# Patient Record
Sex: FEMALE | Race: OTHER | Employment: UNEMPLOYED | URBAN - METROPOLITAN AREA
[De-identification: names, ages, dates, MRNs, and addresses within clinical notes are randomized per-mention and may not be internally consistent; named-entity substitution may affect disease eponyms.]

---

## 2017-06-19 ENCOUNTER — HOSPITAL ENCOUNTER (EMERGENCY)
Age: 22
Discharge: HOME | End: 2017-06-19
Attending: STUDENT IN AN ORGANIZED HEALTH CARE EDUCATION/TRAINING PROGRAM
Payer: SELF-PAY

## 2017-06-19 ENCOUNTER — APPOINTMENT (OUTPATIENT)
Dept: ULTRASOUND IMAGING | Age: 22
End: 2017-06-19
Attending: STUDENT IN AN ORGANIZED HEALTH CARE EDUCATION/TRAINING PROGRAM
Payer: SELF-PAY

## 2017-06-19 VITALS
TEMPERATURE: 98.1 F | HEART RATE: 70 BPM | OXYGEN SATURATION: 100 % | RESPIRATION RATE: 18 BRPM | DIASTOLIC BLOOD PRESSURE: 66 MMHG | BODY MASS INDEX: 22.27 KG/M2 | WEIGHT: 110.45 LBS | SYSTOLIC BLOOD PRESSURE: 113 MMHG | HEIGHT: 59 IN

## 2017-06-19 DIAGNOSIS — O03.9 SPONTANEOUS ABORTION: Primary | ICD-10-CM

## 2017-06-19 LAB
ABO + RH BLD: NORMAL
ALBUMIN SERPL BCP-MCNC: 2.9 G/DL (ref 3.5–5)
ALBUMIN/GLOB SERPL: 0.8 {RATIO} (ref 1.1–2.2)
ALP SERPL-CCNC: 109 U/L (ref 45–117)
ALT SERPL-CCNC: 21 U/L (ref 12–78)
ANION GAP BLD CALC-SCNC: 8 MMOL/L (ref 5–15)
APPEARANCE UR: ABNORMAL
AST SERPL W P-5'-P-CCNC: 16 U/L (ref 15–37)
BACTERIA URNS QL MICRO: NEGATIVE /HPF
BASOPHILS # BLD AUTO: 0 K/UL (ref 0–0.1)
BASOPHILS # BLD: 0 % (ref 0–1)
BILIRUB SERPL-MCNC: 0.2 MG/DL (ref 0.2–1)
BILIRUB UR QL: NEGATIVE
BLOOD GROUP ANTIBODIES SERPL: NORMAL
BUN SERPL-MCNC: 10 MG/DL (ref 6–20)
BUN/CREAT SERPL: 22 (ref 12–20)
CALCIUM SERPL-MCNC: 8.2 MG/DL (ref 8.5–10.1)
CHLORIDE SERPL-SCNC: 110 MMOL/L (ref 97–108)
CO2 SERPL-SCNC: 21 MMOL/L (ref 21–32)
COLOR UR: ABNORMAL
CREAT SERPL-MCNC: 0.46 MG/DL (ref 0.55–1.02)
EOSINOPHIL # BLD: 0.2 K/UL (ref 0–0.4)
EOSINOPHIL NFR BLD: 2 % (ref 0–7)
EPITH CASTS URNS QL MICRO: ABNORMAL /LPF
ERYTHROCYTE [DISTWIDTH] IN BLOOD BY AUTOMATED COUNT: 13 % (ref 11.5–14.5)
GLOBULIN SER CALC-MCNC: 3.5 G/DL (ref 2–4)
GLUCOSE SERPL-MCNC: 94 MG/DL (ref 65–100)
GLUCOSE UR STRIP.AUTO-MCNC: NEGATIVE MG/DL
HCG SERPL-ACNC: 352 MIU/ML (ref 0–6)
HCG UR QL: POSITIVE
HCT VFR BLD AUTO: 36 % (ref 35–47)
HGB BLD-MCNC: 12.2 G/DL (ref 11.5–16)
HGB UR QL STRIP: ABNORMAL
KETONES UR QL STRIP.AUTO: NEGATIVE MG/DL
LEUKOCYTE ESTERASE UR QL STRIP.AUTO: ABNORMAL
LIPASE SERPL-CCNC: 81 U/L (ref 73–393)
LYMPHOCYTES # BLD AUTO: 18 % (ref 12–49)
LYMPHOCYTES # BLD: 2.1 K/UL (ref 0.8–3.5)
MCH RBC QN AUTO: 30.4 PG (ref 26–34)
MCHC RBC AUTO-ENTMCNC: 33.9 G/DL (ref 30–36.5)
MCV RBC AUTO: 89.8 FL (ref 80–99)
MONOCYTES # BLD: 0.9 K/UL (ref 0–1)
MONOCYTES NFR BLD AUTO: 7 % (ref 5–13)
NEUTS SEG # BLD: 8.8 K/UL (ref 1.8–8)
NEUTS SEG NFR BLD AUTO: 73 % (ref 32–75)
NITRITE UR QL STRIP.AUTO: NEGATIVE
PH UR STRIP: 7 [PH] (ref 5–8)
PLATELET # BLD AUTO: 181 K/UL (ref 150–400)
POTASSIUM SERPL-SCNC: 3.5 MMOL/L (ref 3.5–5.1)
PROT SERPL-MCNC: 6.4 G/DL (ref 6.4–8.2)
PROT UR STRIP-MCNC: 300 MG/DL
RBC # BLD AUTO: 4.01 M/UL (ref 3.8–5.2)
RBC #/AREA URNS HPF: >100 /HPF (ref 0–5)
SODIUM SERPL-SCNC: 139 MMOL/L (ref 136–145)
SP GR UR REFRACTOMETRY: 1.02 (ref 1–1.03)
SPECIMEN EXP DATE BLD: NORMAL
UROBILINOGEN UR QL STRIP.AUTO: 0.2 EU/DL (ref 0.2–1)
WBC # BLD AUTO: 12 K/UL (ref 3.6–11)
WBC URNS QL MICRO: ABNORMAL /HPF (ref 0–4)

## 2017-06-19 PROCEDURE — 81025 URINE PREGNANCY TEST: CPT

## 2017-06-19 PROCEDURE — 76801 OB US < 14 WKS SINGLE FETUS: CPT

## 2017-06-19 PROCEDURE — 36415 COLL VENOUS BLD VENIPUNCTURE: CPT | Performed by: STUDENT IN AN ORGANIZED HEALTH CARE EDUCATION/TRAINING PROGRAM

## 2017-06-19 PROCEDURE — 83690 ASSAY OF LIPASE: CPT | Performed by: STUDENT IN AN ORGANIZED HEALTH CARE EDUCATION/TRAINING PROGRAM

## 2017-06-19 PROCEDURE — 80053 COMPREHEN METABOLIC PANEL: CPT | Performed by: STUDENT IN AN ORGANIZED HEALTH CARE EDUCATION/TRAINING PROGRAM

## 2017-06-19 PROCEDURE — 96374 THER/PROPH/DIAG INJ IV PUSH: CPT

## 2017-06-19 PROCEDURE — 88305 TISSUE EXAM BY PATHOLOGIST: CPT | Performed by: STUDENT IN AN ORGANIZED HEALTH CARE EDUCATION/TRAINING PROGRAM

## 2017-06-19 PROCEDURE — 99285 EMERGENCY DEPT VISIT HI MDM: CPT

## 2017-06-19 PROCEDURE — 76856 US EXAM PELVIC COMPLETE: CPT

## 2017-06-19 PROCEDURE — 84702 CHORIONIC GONADOTROPIN TEST: CPT | Performed by: STUDENT IN AN ORGANIZED HEALTH CARE EDUCATION/TRAINING PROGRAM

## 2017-06-19 PROCEDURE — 85025 COMPLETE CBC W/AUTO DIFF WBC: CPT | Performed by: STUDENT IN AN ORGANIZED HEALTH CARE EDUCATION/TRAINING PROGRAM

## 2017-06-19 PROCEDURE — 74011250636 HC RX REV CODE- 250/636: Performed by: STUDENT IN AN ORGANIZED HEALTH CARE EDUCATION/TRAINING PROGRAM

## 2017-06-19 PROCEDURE — 81001 URINALYSIS AUTO W/SCOPE: CPT | Performed by: STUDENT IN AN ORGANIZED HEALTH CARE EDUCATION/TRAINING PROGRAM

## 2017-06-19 PROCEDURE — 86900 BLOOD TYPING SEROLOGIC ABO: CPT | Performed by: STUDENT IN AN ORGANIZED HEALTH CARE EDUCATION/TRAINING PROGRAM

## 2017-06-19 PROCEDURE — 76830 TRANSVAGINAL US NON-OB: CPT

## 2017-06-19 RX ORDER — MORPHINE SULFATE 2 MG/ML
4 INJECTION, SOLUTION INTRAMUSCULAR; INTRAVENOUS ONCE
Status: COMPLETED | OUTPATIENT
Start: 2017-06-19 | End: 2017-06-19

## 2017-06-19 RX ADMIN — Medication 4 MG: at 08:15

## 2017-06-19 NOTE — PROGRESS NOTES
Pt is completely Greek speaking, no  with her. I called Leena Bethea from [facility]. Lien Roberts came to the [address] room to help with translation and understanding; as well as followed patient back to ER room 22 to help communication between pt/family and entire team of staff.  [email or web address]

## 2017-06-19 NOTE — ED TRIAGE NOTES
Patient arriving c/o suprapubic pain starting at 0100. Reports increasingly worse over the night. Denies N/V/D. Patient reports vaginal bleeding since yesterday morning.

## 2017-06-19 NOTE — DISCHARGE INSTRUCTIONS
We hope that we have addressed all of your medical concerns. The examination and treatment you received in the Emergency Department were for an emergent problem and were not intended as complete care. It is important that you follow up with your healthcare provider(s) for ongoing care. If your symptoms worsen or do not improve as expected, and you are unable to reach your usual health care provider(s), you should return to the Emergency Department. Today's healthcare is undergoing tremendous change, and patient satisfaction surveys are one of the many tools to assess the quality of medical care. You may receive a survey from the [employer] regarding your experience in the Emergency Department. I hope that your experience has been completely positive, particularly the medical care that I provided. As such, please participate in the survey; anything less than excellent does not meet my expectations or intentions. Former[facility], later [facility] and 8 [facility] participate in nationally recognized quality of care measures. If your blood pressure is greater than 120/80, as reported below, we urge that you seek medical care to address the potential of high blood pressure, commonly known as hypertension. Hypertension can be hereditary or can be caused by certain medical conditions, pain, stress, or \"white coat syndrome. \"       Please make an appointment with your health care provider(s) for follow up of your Emergency Department visit. VITALS:   Patient Vitals for the past 8 hrs:   Temp Pulse Resp BP SpO2   06/19/17 1030 - - - 101/62 100 %   06/19/17 1000 - - - 106/63 100 %   06/19/17 0800 - - - 117/71 100 %   06/19/17 0738 98.5 °F (36.9 °C) 77 16 121/62 100 %          Thank you for allowing us to provide you with medical care today. We realize that you have many choices for your emergency care needs.   Please choose us in the future for any continued health care needs. Austen Kearney, [address] [facility].   Office: [phone]            Recent Results (from the past 24 hour(s))   CBC WITH AUTOMATED DIFF    Collection Time: 06/19/17  7:49 AM   Result Value Ref Range    WBC 12.0 (H) 3.6 - 11.0 K/uL    RBC 4.01 3.80 - 5.20 M/uL    HGB 12.2 11.5 - 16.0 g/dL    HCT 36.0 35.0 - 47.0 %    MCV 89.8 80.0 - 99.0 FL    MCH 30.4 26.0 - 34.0 PG    MCHC 33.9 30.0 - 36.5 g/dL    RDW 13.0 11.5 - 14.5 %    PLATELET 030 496 - 048 K/uL    NEUTROPHILS 73 32 - 75 %    LYMPHOCYTES 18 12 - 49 %    MONOCYTES 7 5 - 13 %    EOSINOPHILS 2 0 - 7 %    BASOPHILS 0 0 - 1 %    ABS. NEUTROPHILS 8.8 (H) 1.8 - 8.0 K/UL    ABS. LYMPHOCYTES 2.1 0.8 - 3.5 K/UL    ABS. MONOCYTES 0.9 0.0 - 1.0 K/UL    ABS. EOSINOPHILS 0.2 0.0 - 0.4 K/UL    ABS. BASOPHILS 0.0 0.0 - 0.1 K/UL   LIPASE    Collection Time: 06/19/17  7:49 AM   Result Value Ref Range    Lipase 81 73 - 881 U/L   METABOLIC PANEL, COMPREHENSIVE    Collection Time: 06/19/17  7:49 AM   Result Value Ref Range    Sodium 139 136 - 145 mmol/L    Potassium 3.5 3.5 - 5.1 mmol/L    Chloride 110 (H) 97 - 108 mmol/L    CO2 21 21 - 32 mmol/L    Anion gap 8 5 - 15 mmol/L    Glucose 94 65 - 100 mg/dL    BUN 10 6 - 20 MG/DL    Creatinine 0.46 (L) 0.55 - 1.02 MG/DL    BUN/Creatinine ratio 22 (H) 12 - 20      GFR est AA >60 >60 ml/min/1.73m2    GFR est non-AA >60 >60 ml/min/1.73m2    Calcium 8.2 (L) 8.5 - 10.1 MG/DL    Bilirubin, total 0.2 0.2 - 1.0 MG/DL    ALT (SGPT) 21 12 - 78 U/L    AST (SGOT) 16 15 - 37 U/L    Alk. phosphatase 109 45 - 117 U/L    Protein, total 6.4 6.4 - 8.2 g/dL    Albumin 2.9 (L) 3.5 - 5.0 g/dL    Globulin 3.5 2.0 - 4.0 g/dL    A-G Ratio 0.8 (L) 1.1 - 2.2     TOTAL HCG, QT.     Collection Time: 06/19/17  7:49 AM   Result Value Ref Range    Beta HCG,  (H) 0 - 6 MIU/ML   URINALYSIS W/ RFLX MICROSCOPIC    Collection Time: 06/19/17  8:37 AM   Result Value Ref Range    Color RED      Appearance BLOODY (A) CLEAR      Specific gravity 1.025 1.003 - 1.030      pH (UA) 7.0 5.0 - 8.0      Protein 300 (A) NEG mg/dL    Glucose NEGATIVE  NEG mg/dL    Ketone NEGATIVE  NEG mg/dL    Bilirubin NEGATIVE  NEG      Blood LARGE (A) NEG      Urobilinogen 0.2 0.2 - 1.0 EU/dL    Nitrites NEGATIVE  NEG      Leukocyte Esterase SMALL (A) NEG     URINE MICROSCOPIC ONLY    Collection Time: 06/19/17  8:37 AM   Result Value Ref Range    WBC 10-20 0 - 4 /hpf    RBC >100 (H) 0 - 5 /hpf    Epithelial cells MODERATE (A) FEW /lpf    Bacteria NEGATIVE  NEG /hpf   TYPE & SCREEN    Collection Time: 06/19/17  9:44 AM   Result Value Ref Range    Crossmatch Expiration 06/22/2017     ABO/Rh(D) Mikaeleane Arlene POSITIVE     Antibody screen NEG    HCG URINE, QL. - POC    Collection Time: 06/19/17  9:47 AM   Result Value Ref Range    Pregnancy test,urine (POC) POSITIVE (A) NEG         Us Preg Uts < 14 Wks Sngl    Result Date: 6/19/2017  EXAM:  US PREG UTS < 14 WKS SNGL INDICATION:   vagina bleeding, lower abd pain COMPARISON: None. TECHNIQUE: Realtime sonography of the pelvis was performed transabdominally with multiple static images obtained. FINDINGS: During the ultrasound it immediately became aware that there was a nonviable pregnancy that was not within the uterus and by transabdominal ultrasound was demonstrated within the vagina with a portion possibly still within the cervix. The cervix is open. The gestation and no evidence of cardiac activity. Crown-rump length was measured at approximately 3.37 cm corresponding to 10 weeks 2 days gestational age. Evaluation of placenta and amniotic fluid quantity was not applicable considering location. Uterus is enlarged measuring 3.9 x 7.0 x 4.4 cm. The endometrium measures up to 2.3 cm. The right ovary measures 3.6 x 2.7 x 1.8 cm and has normal Doppler flow. The left ovary measures 2.8 x 2.7 x 1.3 cm with normal Doppler flow. IMPRESSION: 1.  Actively aborting nonviable gestation within the vagina with approximate CRL measurements suggesting approximately 10 week gestation without cardiac activity. 2. Ultrasound technologist notes ED M.D. came and did speculum vaginal exam removing fetal parts. Subsequent limited ultrasound demonstrates no residual gestational findings. Aborto espontáneo: Instrucciones de cuidado - [ Miscarriage: Care Instructions ]  Instrucciones de cuidado    Perder un embarazo puede ser muy difícil. Laveta  se pregunte por qué sucedió, o se eche la culpa. Los abortos espontáneos (naturales) son comunes y no son causados por el ejercicio, el [address] [place] [address]. La mayoría ocurren debido a que el óvulo fecundado en el útero no se desarrolla de manera normal.  No existe ningún tratamiento que pueda detener un aborto espontáneo. Mientras no tenga [facility] gran pérdida de [place], [facility], debilidad ni otras señales de [facility], puede dejar que un aborto espontáneo siga ruiz propio curso. [place] puede tardar varios días. Ruiz cuerpo se recuperará a lo natali de las [employer]. Tener un aborto espontáneo no significa que no pueda tener un embarazo normal en el futuro. El médico la rivera examinado minuciosamente, adela pueden desarrollarse problemas más tarde. Si nota algún problema o nuevos síntomas, busque tratamiento médico de inmediato. La atención de seguimiento es qasim parte clave de ruiz tratamiento y seguridad. Asegúrese de hacer y acudir a todas las citas, y llame a ruiz médico si está teniendo problemas. También es qasim buena idea saber los resultados de francesca exámenes y mantener qasim lista de los medicamentos que lisy. ¿Cómo puede cuidarse en el hogar? · Quizás tenga algo de sangrado vaginal por 1 o 2 semanas. Puede que sea similar o un poco más intenso que el período menstrual habitual. Debería tener menos sangrado después de [place]. Use toallas sanitarias en lugar de tampones.  Puede usar tampones urszula el siguiente período menstrual, el cual debería llegar al cabo de 3 a 6 semanas. · [place] un analgésico (medicamento para el dolor) de venta lisa, yvan acetaminofén (Tylenol), ibuprofeno (Advil, Motrin) o naproxeno (Aleve) para los cólicos. [place] y siga todas las instrucciones de la [place]. Quizás sienta cólicos por varios días después del aborto espontáneo. · No tome dos o más analgésicos al mismo tiempo a menos que el médico se lo haya indicado. Muchos analgésicos contienen acetaminofén, lo cual es Tylenol. El exceso de acetaminofén (Tylenol) puede ser dañino. · Use un recipiente transparente para almacenar todo tejido que expulse. Llévelo al consultorio del médico tan pronto yvan pueda. · No tenga relaciones sexuales hasta que deje de sangrar. · Puede volver a francesca actividades normales si se siente lo suficientemente carmen yvan para hacerlo. Sonali debe evitar hacer ejercicio arduo hasta que deje de sangrar. · Si planea volver a quedar embarazada, hable con ruiz médico. La mayoría de los médicos recomiendan esperar hasta que haya tenido al menos un período menstrual normal antes de intentar Alvaro Abdalla. · Si no desea quedar embarazada, pregúntele a ruiz médico sobre los métodos anticonceptivos. Puede volver a quedar embarazada antes de que comience ruiz siguiente menstruación si no está utilizando algún [employer]. · Quizás tenga un nivel bajo de garrison por la pérdida de [place]. Siga qasim dieta equilibrada que sea arturo en garrison y vitamina C. Los alimentos ricos en garrison incluyen las neena murillo, los [place], los [place], los frijoles y las verduras de hojas verdes. Los alimentos con alto contenido de vitamina C [place] cítricos, los tomates y el brócoli. Hable con ruiz médico sobre si usted debe abdon pastillas de garrison o un multivitamínico.  · Perder un embarazo puede ser muy difícil. Quizás se pregunte por qué sucedió y se culpe a sí misma. Hablar con familiares, amigos, un asesor profesional o ruiz médico puede ayudarle a sobrellevar la pérdida.   ¿Cuándo debe pedir ayuda? Llame al 911 en cualquier momento que crea que puede necesitar atención de urgencias vitales. Por ejemplo, llame si:  · Tiene dolor repentino e intenso en el vientre o la pelvis. · Se desmayó (perdió el conocimiento). · Tiene sangrado vaginal intenso. Llame a ruiz médico ahora mismo o busque atención médica inmediata si:  · Se siente mareada o aturdida, o siente que está a punto de [place]. · Tiene nuevo o mayor dolor en el vientre o la pelvis. · Ruiz sangrado vaginal está empeorando. · Tiene dolor que ha aumentado en la kareem vaginal.  · Tiene fiebre. Vigile de cerca los cambios en ruiz janiya y asegúrese de comunicarse con ruiz médico si:  · Tiene secreción vaginal nueva o peor. · No mejora yvan se esperaba. ¿Dónde puede encontrar más información en inglés? Sydnee Hooker a http://jacey-mitch.info/. Escriba E802 en la búsqueda para aprender más acerca de \"Aborto espontáneo: Instrucciones de cuidado - [ Miscarriage: Care Instructions ]. \"  Revisado: [address] [place], 2016  Versión del contenido: 11.2  © 20063599-5554 Healthwise, Incorporated. Las instrucciones de cuidado fueron adaptadas bajo licencia por [employer] (which disclaims liability or warranty for this information). Si usted tiene [place] afección médica o sobre estas instrucciones, siempre pregunte a ruiz profesional de janiya. Healthwise, Incorporated niega toda garantía o responsabilidad por ruiz uso de esta información.

## 2017-06-19 NOTE — ED PROVIDER NOTES
HPI Comments: 19 y.o. female with no significant past medical history who presents with chief complaint of abdominal pain. Pt complains of suprapubic abd pain with associated vaginal bleeding that began at 0100 today (~7 hours ago). Her abd pain has become increasingly worse throughout the morning. Pt states that she has to wipe herself every 5 minutes. LMP was 2 months ago; pt is unsure of chance of pregnancy. She has never been pregnant before. Pt denies any fever, nausea, or vomiting. There are no other acute medical concerns at this time. PCP: No primary care provider on file. Note written by Melissa Davis, as dictated by Amanda Goins MD 7:54 AM      The history is provided by the patient. A  was used (Dr. Abdiaziz Boland). No past medical history on file. No past surgical history on file. No family history on file. Social History     Social History    Marital status: N/A     Spouse name: N/A    Number of children: N/A    Years of education: N/A     Occupational History    Not on file. Social History Main Topics    Smoking status: Never Smoker    Smokeless tobacco: Not on file    Alcohol use Not on file    Drug use: Not on file    Sexual activity: Not on file     Other Topics Concern    Not on file     Social History Narrative    No narrative on file         ALLERGIES: Review of patient's allergies indicates no known allergies. Review of Systems   Constitutional: Negative for fever. Gastrointestinal: Positive for abdominal pain (suprapubic). Negative for nausea and vomiting. Genitourinary: Positive for vaginal bleeding. All other systems reviewed and are negative. Vitals:    06/19/17 0738   BP: 121/62   Pulse: 77   Resp: 16   Temp: 98.5 °F (36.9 °C)   SpO2: 100%   Weight: 50.1 kg (110 lb 7.2 oz)   Height: 4' 11\" (1.499 m)            Physical Exam   Constitutional: She is oriented to person, place, and time. She appears well-developed.  No distress. HENT:   Head: Normocephalic and atraumatic. Eyes: Conjunctivae and EOM are normal. Pupils are equal, round, and reactive to light. Neck: Normal range of motion. Neck supple. Cardiovascular: Normal rate, regular rhythm and normal heart sounds. No murmur heard. Pulmonary/Chest: Effort normal and breath sounds normal. No respiratory distress. Abdominal: Soft. Bowel sounds are normal. She exhibits no distension. There is tenderness (moderate) in the right lower quadrant, suprapubic area and left lower quadrant. There is guarding (mild). There is no rebound. Genitourinary: Vagina normal.   Genitourinary Comments: Upon insertion of speculum, yolk sac noted within vagina. This was removed with ringed forceps. Remaining POC in cervical os, which was minimally dilated. . This was also manually removed. Musculoskeletal: Normal range of motion. She exhibits no edema. Neurological: She is alert and oriented to person, place, and time. No cranial nerve deficit. She exhibits normal muscle tone. Coordination normal.   Skin: Skin is warm and dry. No rash noted. Psychiatric: She has a normal mood and affect. Her behavior is normal.   Nursing note and vitals reviewed. MDM  ED Course       Procedures    Symptoms resolved at time of discharge. No more bleeding or cramping. RTER precautions provided.

## 2020-04-20 ENCOUNTER — TELEPHONE (OUTPATIENT)
Dept: FAMILY MEDICINE CLINIC | Age: 25
End: 2020-04-20